# Patient Record
Sex: MALE | Race: WHITE | Employment: OTHER | URBAN - METROPOLITAN AREA
[De-identification: names, ages, dates, MRNs, and addresses within clinical notes are randomized per-mention and may not be internally consistent; named-entity substitution may affect disease eponyms.]

---

## 2017-10-09 ENCOUNTER — OFFICE VISIT (OUTPATIENT)
Dept: INTERNAL MEDICINE CLINIC | Age: 42
End: 2017-10-09

## 2017-10-09 VITALS
SYSTOLIC BLOOD PRESSURE: 139 MMHG | DIASTOLIC BLOOD PRESSURE: 88 MMHG | WEIGHT: 207 LBS | BODY MASS INDEX: 27.43 KG/M2 | HEIGHT: 73 IN | HEART RATE: 50 BPM

## 2017-10-09 DIAGNOSIS — R10.84 GENERALIZED ABDOMINAL PAIN: Primary | ICD-10-CM

## 2017-10-09 PROBLEM — R19.7 DIARRHEA: Status: ACTIVE | Noted: 2017-10-09

## 2017-10-09 PROBLEM — M76.50 TENDONITIS, PATELLAR: Status: ACTIVE | Noted: 2017-10-09

## 2017-10-09 NOTE — MR AVS SNAPSHOT
Visit Information Date & Time Provider Department Dept. Phone Encounter #  
 10/9/2017 10:30 AM PARISH Mello MD [address] [place] [address] [facility] [phone] [id number] Follow-up Instructions Return for as scheduled. Upcoming Health Maintenance Date Due DTaP/Tdap/Td series (1 - Tdap) 3/29/1998 INFLUENZA AGE 9 TO ADULT 8/1/2017 Allergies as of 10/9/2017  Review Complete On: 10/9/2017 By: Maite Lopez MD  
 Not on File Current Immunizations  Never Reviewed Name Date Influenza Vaccine 10/19/2015 Not reviewed this visit You Were Diagnosed With   
  
 Codes Comments Generalized abdominal pain    -  Primary ICD-10-CM: R10.84 ICD-9-CM: 789.07 Vitals BP Pulse Height(growth percentile) Weight(growth percentile) BMI Smoking Status 139/88 (BP 1 Location: Left arm, BP Patient Position: Sitting) (!) 50 6' 1\" (1.854 m) 207 lb (93.9 kg) 27.31 kg/m2 Never Smoker Vitals History BMI and BSA Data Body Mass Index Body Surface Area  
 27.31 kg/m 2 2.2 m 2 Your Updated Medication List  
  
Notice  As of 10/9/2017 11:47 AM  
 You have not been prescribed any medications. We Performed the Following CBC WITH AUTOMATED DIFF [42035 CPT(R)] CELIAC ANTIBODY PROFILE [ZEI01429 Custom] METABOLIC PANEL, COMPREHENSIVE [97916 CPT(R)] REFERRAL TO GASTROENTEROLOGY [YYJ34 Custom] Comments:  
 Dr. Bozena Young Please see patient for abdominal pain. Follow-up Instructions Return for as scheduled. Referral Information Referral ID Referred By Referred To  
  
 [id number] Ese Alvarez MD   
   [address] [place] [address] [address] [place] [address] Phone: [phone] Fax: [phone] Visits Status Start Date End Date 1 New Request 10/9/17 10/9/18  If your referral has a status of pending review or denied, additional information will be sent to support the outcome of this decision. Introducing [facility]! Joaquinjuanquynh Barnarda introduces QReca! patient portal. Now you can access parts of your medical record, email your doctor's office, and request medication refills online. 1. In your internet browser, go to [email or web address] 2. Click on the First Time User? Click Here link in the Sign In box. You will see the New Member Sign Up page. 3. Enter your QReca! Access Code exactly as it appears below. You will not need to use this code after youve completed the sign-up process. If you do not sign up before the expiration date, you must request a new code. · QReca! Access Code: [email or web address] Expires: 1/7/2018 10:56 AM 
 
4. Enter the last four digits of your Social Security Number (xxxx) and Date of Birth (mm/dd/yyyy) as indicated and click Submit. You will be taken to the next sign-up page. 5. Create a QReca! ID. This will be your QReca! login ID and cannot be changed, so think of one that is secure and easy to remember. 6. Create a QReca! password. You can change your password at any time. 7. Enter your Password Reset Question and Answer. This can be used at a later time if you forget your password. 8. Enter your e-mail address. You will receive e-mail notification when new information is available in [address] 9. Click Sign Up. You can now view and download portions of your medical record. 10. Click the Download Summary menu link to download a portable copy of your medical information. If you have questions, please visit the Frequently Asked Questions section of the QReca! website. Remember, QReca! is NOT to be used for urgent needs. For medical emergencies, dial 911. Now available from your iPhone and Android! Please provide this summary of care documentation to your next provider. Your primary care clinician is listed as PARISH Lawson.  If you have any questions after today's visit, please call [phone].

## 2017-10-09 NOTE — PROGRESS NOTES
This note will not be viewable in [address] Magno Harris is a 38 y.o. male and presents with Irritable Bowel Syndrome (RM 2 - talk about seeing GI MD)  . Subjective:  Dina Zapata presents today with complaint of severe abdominal pain that has occurred on 2 separate occasions. This was associated with severe crampy abdominal pain. This came on out of the blue. It was associated with diaphoresis. It lasted for approximately 1 hour. It culminated in explosive diarrhea. There was no blood or melena noted. Once the symptoms subsided there were no residual symptoms thereafter. This does not appear to be related to certain foods. Past Medical History:   Diagnosis Date    Diarrhea 10/9/2017    Tendonitis, patellar 10/9/2017     History reviewed. No pertinent surgical history. Not on File    Social History     Social History    Marital status:      Spouse name: N/A    Number of children: N/A    Years of education: N/A     Social History Main Topics    Smoking status: Never Smoker    Smokeless tobacco: Never Used    Alcohol use Yes      Comment: social    Drug use: No    Sexual activity: Not Asked     Other Topics Concern    None     Social History Narrative     History reviewed. No pertinent family history. Review of Systems  Constitutional:  negative for fevers, chills, anorexia and weight loss  Eyes:    negative for visual disturbance and irritation  ENT:    negative for tinnitus,sore throat,nasal congestion,ear pains. hoarseness  Respiratory:     negative for cough, hemoptysis, dyspnea,wheezing  CV:    negative for chest pain, palpitations, lower extremity edema  GI:    negative for nausea, vomiting, diarrhea, abdominal pain,melena  Endo:               negative for polyuria,polydipsia,polyphagia,heat intolerance  Genitourinary : negative for frequency, dysuria and hematuria  Integumentary: negative for rash and pruritus  Hematologic:   negative for easy bruising and gum/nose bleeding  Musculoskel:  negative for myalgias, arthralgias, back pain, muscle weakness, joint pain  Neurological:   negative for headaches, dizziness, vertigo, memory problems and gait   Behavl/Psych:  negative for feelings of anxiety, depression, mood changes  ROS otherwise negative      Objective:  Visit Vitals    /88 (BP 1 Location: Left arm, BP Patient Position: Sitting)    Pulse (!) 50    Ht 6' 1\" (1.854 m)    Wt 207 lb (93.9 kg)    BMI 27.31 kg/m2     Physical Exam:   General appearance - alert, well appearing, and in no distress  Mental status - alert, oriented to person, place, and time  Chest - clear to auscultation, no wheezes, rales or rhonchi, symmetric air entry   Heart - normal rate, regular rhythm, normal S1, S2, no murmurs, rubs, clicks or gallops   Abdomen - soft, nontender, nondistended, no masses or organomegaly  Lymph- no adenopathy palpable  Ext-peripheral pulses normal, no pedal edema, no clubbing or cyanosis  Skin-Warm and dry. no hyperpigmentation, vitiligo, or suspicious lesions  Neuro -alert, oriented, normal speech, no focal findings or movement disorder noted      Assessment/Plan:  Diagnoses and all orders for this visit:    1. Generalized abdominal pain  -     CELIAC ANTIBODY PROFILE  -     REFERRAL TO GASTROENTEROLOGY  -     CBC WITH AUTOMATED DIFF  -     METABOLIC PANEL, COMPREHENSIVE          ICD-10-CM ICD-9-CM    1. Generalized abdominal pain R10.84 789.07 CELIAC ANTIBODY PROFILE      REFERRAL TO GASTROENTEROLOGY      CBC WITH AUTOMATED DIFF      METABOLIC PANEL, COMPREHENSIVE      CANCELED: AMB POC COMPLETE CBC,AUTOMATED ENTER      CANCELED: AMB POC COMPREHENSIVE METABOLIC PANEL   Plan:    Patient symptoms are rather abrupt in onset and associated with severe diarrhea and abdominal pain. He will be referred to gastroenterology for further evaluation. Labs have been ordered for further review.   Differential includes inflammatory bowel disease, irritable bowel syndrome, partial obstruction, or other etiology. Follow-up Disposition:  Return for as scheduled. I have reviewed with the patient details of the assessment and plan and all questions were answered. Relevent patient education was performed. Verbal and/or written instructions (see AVS) provided. The most recent lab findings were reviewed with the patient. Plan was discussed with patient who verbally expressed understanding. An After Visit Summary was printed and given to the patient.     Eliazar Alfonso MD

## 2017-10-09 NOTE — PROGRESS NOTES
Shanon Li is a 38 y.o. male presenting for Irritable Bowel Syndrome (RM 2 - talk about seeing GI MD)  . 1. Have you been to the ER, urgent care clinic since your last visit? Hospitalized since your last visit? No    2. Have you seen or consulted any other health care providers outside of the [address] [place] [address] since your last visit? Include any pap smears or colon screening.  No

## 2017-10-11 LAB
ALBUMIN SERPL-MCNC: 4.5 G/DL (ref 3.5–5.5)
ALBUMIN/GLOB SERPL: 1.6 {RATIO} (ref 1.2–2.2)
ALP SERPL-CCNC: 99 IU/L (ref 39–117)
ALT SERPL-CCNC: 22 IU/L (ref 0–44)
AST SERPL-CCNC: 23 IU/L (ref 0–40)
BASOPHILS # BLD AUTO: 0 X10E3/UL (ref 0–0.2)
BASOPHILS NFR BLD AUTO: 1 %
BILIRUB SERPL-MCNC: 0.5 MG/DL (ref 0–1.2)
BUN SERPL-MCNC: 17 MG/DL (ref 6–24)
BUN/CREAT SERPL: 17 (ref 9–20)
CALCIUM SERPL-MCNC: 9.6 MG/DL (ref 8.7–10.2)
CHLORIDE SERPL-SCNC: 100 MMOL/L (ref 96–106)
CO2 SERPL-SCNC: 26 MMOL/L (ref 18–29)
CREAT SERPL-MCNC: 1.03 MG/DL (ref 0.76–1.27)
EOSINOPHIL # BLD AUTO: 0.1 X10E3/UL (ref 0–0.4)
EOSINOPHIL NFR BLD AUTO: 2 %
ERYTHROCYTE [DISTWIDTH] IN BLOOD BY AUTOMATED COUNT: 12.9 % (ref 12.3–15.4)
GLIADIN PEPTIDE IGA SER-ACNC: 7 UNITS (ref 0–19)
GLIADIN PEPTIDE IGG SER-ACNC: 2 UNITS (ref 0–19)
GLOBULIN SER CALC-MCNC: 2.9 G/DL (ref 1.5–4.5)
GLUCOSE SERPL-MCNC: 95 MG/DL (ref 65–99)
HCT VFR BLD AUTO: 43 % (ref 37.5–51)
HGB BLD-MCNC: 14.7 G/DL (ref 12.6–17.7)
IGA SERPL-MCNC: 377 MG/DL (ref 90–386)
IMM GRANULOCYTES # BLD: 0 X10E3/UL (ref 0–0.1)
IMM GRANULOCYTES NFR BLD: 0 %
LYMPHOCYTES # BLD AUTO: 2.1 X10E3/UL (ref 0.7–3.1)
LYMPHOCYTES NFR BLD AUTO: 37 %
MCH RBC QN AUTO: 29.1 PG (ref 26.6–33)
MCHC RBC AUTO-ENTMCNC: 34.2 G/DL (ref 31.5–35.7)
MCV RBC AUTO: 85 FL (ref 79–97)
MONOCYTES # BLD AUTO: 0.6 X10E3/UL (ref 0.1–0.9)
MONOCYTES NFR BLD AUTO: 11 %
NEUTROPHILS # BLD AUTO: 2.8 X10E3/UL (ref 1.4–7)
NEUTROPHILS NFR BLD AUTO: 49 %
PLATELET # BLD AUTO: 203 X10E3/UL (ref 150–379)
POTASSIUM SERPL-SCNC: 4.7 MMOL/L (ref 3.5–5.2)
PROT SERPL-MCNC: 7.4 G/DL (ref 6–8.5)
RBC # BLD AUTO: 5.05 X10E6/UL (ref 4.14–5.8)
SODIUM SERPL-SCNC: 140 MMOL/L (ref 134–144)
TTG IGA SER-ACNC: <2 U/ML (ref 0–3)
TTG IGG SER-ACNC: <2 U/ML (ref 0–5)
WBC # BLD AUTO: 5.6 X10E3/UL (ref 3.4–10.8)

## 2017-12-06 ENCOUNTER — HOSPITAL ENCOUNTER (OUTPATIENT)
Dept: CT IMAGING | Age: 42
Discharge: HOME | End: 2017-12-06
Attending: INTERNAL MEDICINE
Payer: COMMERCIAL

## 2017-12-06 DIAGNOSIS — R10.30 ABDOMINAL PAIN, LOWER: ICD-10-CM

## 2017-12-06 DIAGNOSIS — K92.1 HEMATOCHEZIA: ICD-10-CM

## 2017-12-06 DIAGNOSIS — R19.7 DIARRHEA: ICD-10-CM

## 2017-12-06 PROCEDURE — 74011250636 HC RX REV CODE- 250/636: Performed by: INTERNAL MEDICINE

## 2017-12-06 PROCEDURE — 74011636320 HC RX REV CODE- 636/320: Performed by: INTERNAL MEDICINE

## 2017-12-06 PROCEDURE — 74177 CT ABD & PELVIS W/CONTRAST: CPT

## 2017-12-06 PROCEDURE — 74011000255 HC RX REV CODE- 255: Performed by: INTERNAL MEDICINE

## 2017-12-06 RX ORDER — SODIUM CHLORIDE 9 MG/ML
50 INJECTION, SOLUTION INTRAVENOUS
Status: COMPLETED | OUTPATIENT
Start: 2017-12-06 | End: 2017-12-06

## 2017-12-06 RX ORDER — BARIUM SULFATE 20 MG/ML
900 SUSPENSION ORAL
Status: COMPLETED | OUTPATIENT
Start: 2017-12-06 | End: 2017-12-06

## 2017-12-06 RX ORDER — SODIUM CHLORIDE 0.9 % (FLUSH) 0.9 %
10 SYRINGE (ML) INJECTION
Status: COMPLETED | OUTPATIENT
Start: 2017-12-06 | End: 2017-12-06

## 2017-12-06 RX ADMIN — Medication 10 ML: at 10:36

## 2017-12-06 RX ADMIN — SODIUM CHLORIDE 50 ML/HR: 900 INJECTION, SOLUTION INTRAVENOUS at 10:36

## 2017-12-06 RX ADMIN — IOPAMIDOL 100 ML: 755 INJECTION, SOLUTION INTRAVENOUS at 10:36

## 2017-12-06 RX ADMIN — BARIUM SULFATE 900 ML: 21 SUSPENSION ORAL at 10:36

## 2018-04-04 ENCOUNTER — TELEPHONE (OUTPATIENT)
Dept: DERMATOLOGY | Facility: AMBULATORY SURGERY CENTER | Age: 43
End: 2018-04-04

## 2018-04-25 ENCOUNTER — OFFICE VISIT (OUTPATIENT)
Dept: DERMATOLOGY | Facility: AMBULATORY SURGERY CENTER | Age: 43
End: 2018-04-25

## 2018-04-25 VITALS
HEART RATE: 87 BPM | RESPIRATION RATE: 16 BRPM | SYSTOLIC BLOOD PRESSURE: 136 MMHG | DIASTOLIC BLOOD PRESSURE: 84 MMHG | TEMPERATURE: 98.2 F | OXYGEN SATURATION: 98 %

## 2018-04-25 DIAGNOSIS — C44.319 BASAL CELL CARCINOMA OF JAWLINE: Primary | ICD-10-CM

## 2018-04-25 NOTE — PATIENT INSTRUCTIONS
WOUND CARE INSTRUCTIONS    1. Keep the dressing clean and dry and do not remove for 48 hours. 2. Then change the dressing once a day as follows:  a. Wash hands before and after each dressing change. b. Remove dressing and wash site gently with mild soap and water, rinse, and pat dry.  c. Apply an ointment (Bacitracin, Polysporin, Neosporin, Petroleum jelly or Aquaphor). d. Apply a non-stick (Telfa) dressing or Band-Aid to cover the wound. Remove pressure bandage on friday, then wash gently and apply a thin layer Vaseline and a band-aid to site daily for 1 week. 3. Watch for:  BLEEDING: A small amount of drainage may occur. If bleeding occurs, elevate and rest the surgery site. Apply gauze and steady pressure for 15 minutes. If bleeding continues, call this office. INFECTION: Signs of infection include increased redness, pain, warmth, drainage of pus, and fever. If this occurs, call this office. 4. Special Instructions (follow any that are checked):  · [x] You have stitches that DO NOT need to be removed. · [x] Avoid bending at the waist and heavy lifting for two days. · [x] Sleep with your head elevated for the next two nights. · [x] Rest the surgery site and keep it elevated as much as possible for two days. · [] You may apply an ice-pack for 10-15 minutes every waking hour for the rest of the day. · [] Eat a soft diet and avoid hot food and hot drinks for the rest of the day. · [] Other instructions: Follow up as directed. Take Tylenol or Ibuprofen for pain as needed. Once the site is healed with no remaining bandages or open areas, protect your surgical site and scar from the sun, as this area will be more sensitive. Use a broad spectrum sunscreen SPF 30 or higher daily, and a chemical free product (one containing zinc oxide or titanium dioxide) is a good choice if the area is sensitive.     You may begin to gently massage the surgical site in 2-3 weeks, rubbing in a circular motion along the scar. This can help reduce swelling and thickness of a scar. A scar cream may be used beginnning 1 month after the surgery. If you have any questions or concerns, please call our office Monday through Friday at [phone].

## 2018-04-25 NOTE — MR AVS SNAPSHOT
[facility] [address] 
[phone] Patient: Hany Mcintyre MRN: [id number] :1975 Visit Information Date & Time Provider Department Dept. Phone Encounter #  
 2018 10:00 AM MD Matt Wayne 8057 [phone] [id number] Upcoming Health Maintenance Date Due DTaP/Tdap/Td series (1 - Tdap) 3/29/1998 Influenza Age 5 to Adult 2017 Allergies as of 2018  Review Complete On: 2018 By: Walter De Leon LPN No Known Allergies Current Immunizations  Never Reviewed Name Date Influenza Vaccine 10/19/2015 Not reviewed this visit You Were Diagnosed With   
  
 Codes Comments Basal cell carcinoma of jawline    -  Primary ICD-10-CM: C44.319 ICD-9-CM: 173.31 Vitals BP Pulse Temp Resp SpO2 Smoking Status 136/84 87 98.2 °F (36.8 °C) 16 98% Never Smoker Your Updated Medication List  
  
Notice  As of 2018 10:26 AM  
 You have not been prescribed any medications. Patient Instructions WOUND CARE INSTRUCTIONS 1. Keep the dressing clean and dry and do not remove for 48 hours. 2. Then change the dressing once a day as follows: 
a. Wash hands before and after each dressing change. b. Remove dressing and wash site gently with mild soap and water, rinse, and pat dry. 
c. Apply an ointment (Bacitracin, Polysporin, Neosporin, Petroleum jelly or Aquaphor). d. Apply a non-stick (Telfa) dressing or Band-Aid to cover the wound. Remove pressure bandage on friday, then wash gently and apply a thin layer Vaseline and a band-aid to site daily for 1 week. 3. Watch for: BLEEDING: A small amount of drainage may occur. If bleeding occurs, elevate and rest the surgery site. Apply gauze and steady pressure for 15 minutes. If bleeding continues, call this office. INFECTION: Signs of infection include increased redness, pain, warmth, drainage of pus, and fever. If this occurs, call this office. 4. Special Instructions (follow any that are checked): 
· [x] You have stitches that DO NOT need to be removed. · [x] Avoid bending at the waist and heavy lifting for two days. · [x] Sleep with your head elevated for the next two nights. · [x] Rest the surgery site and keep it elevated as much as possible for two days. · [] You may apply an ice-pack for 10-15 minutes every waking hour for the rest of the day. · [] Eat a soft diet and avoid hot food and hot drinks for the rest of the day. · [] Other instructions: Follow up as directed. Take Tylenol or Ibuprofen for pain as needed. Once the site is healed with no remaining bandages or open areas, protect your surgical site and scar from the sun, as this area will be more sensitive. Use a broad spectrum sunscreen SPF 30 or higher daily, and a chemical free product (one containing zinc oxide or titanium dioxide) is a good choice if the area is sensitive. You may begin to gently massage the surgical site in 2-3 weeks, rubbing in a circular motion along the scar. This can help reduce swelling and thickness of a scar. A scar cream may be used beginnning 1 month after the surgery. If you have any questions or concerns, please call our office Monday through Friday at [phone]. Introducing [facility]! Kamilah Hammonds introduces ChatterBlock patient portal. Now you can access parts of your medical record, email your doctor's office, and request medication refills online. 1. In your internet browser, go to [email or web address] 2. Click on the First Time User? Click Here link in the Sign In box. You will see the New Member Sign Up page. 3. Enter your ChatterBlock Access Code exactly as it appears below. You will not need to use this code after youve completed the sign-up process.  If you do not sign up before the expiration date, you must request a new code. · Invesdor Access Code: [email or web address] Expires: 6/12/2018  1:30 PM 
 
4. Enter the last four digits of your Social Security Number (xxxx) and Date of Birth (mm/dd/yyyy) as indicated and click Submit. You will be taken to the next sign-up page. 5. Create a Invesdor ID. This will be your Invesdor login ID and cannot be changed, so think of one that is secure and easy to remember. 6. Create a Invesdor password. You can change your password at any time. 7. Enter your Password Reset Question and Answer. This can be used at a later time if you forget your password. 8. Enter your e-mail address. You will receive e-mail notification when new information is available in [address] 9. Click Sign Up. You can now view and download portions of your medical record. 10. Click the Download Summary menu link to download a portable copy of your medical information. If you have questions, please visit the Frequently Asked Questions section of the Invesdor website. Remember, Invesdor is NOT to be used for urgent needs. For medical emergencies, dial 911. Now available from your iPhone and Android! Please provide this summary of care documentation to your next provider. Your primary care clinician is listed as PARISH Gnogora. If you have any questions after today's visit, please call [phone].

## 2018-04-25 NOTE — PROGRESS NOTES
This note is written by Leela Mancera, as dictated by Jacque Han. Levi Garrett MD.    CC: Basal cell carcinoma on the right jawline     History of present illness:     Jose Luis Smith is a 41 y.o. male referred by Dr. Kolton Sapp. He has a biopsy-proven nodular basal cell carcinoma on the right jawline. This is a new basal cell carcinoma present for one year described as a lesion noticed by Dr. Kolton Sapp with no prior treatment. Biopsy confirmed the diagnosis of basal cell carcinoma, and I reviewed the written pathology report. He is feeling well and in his usual state of health today. He has no pain, no current illnesses, no other skin concerns. His allergies, medications, medical, and social history are reviewed by me today. Exam:     He is an awake, alert, and oriented 41 y.o. male who appears well and in no distress. There is no preauricular, submandibular, or cervical lymphadenopathy. I examined his face. He has a 14 x 8 mm pearly papule on his right jawline. He confirms location. Assessment/plan:    1. Basal cell carcinoma, right jawline. I discussed the diagnosis of basal cell carcinoma and summarized the pathology report. Mohs surgery is indicated by site and size. The procedure was discussed, verbal and written consent were obtained. I performed the procedure. Two stages were required to reach a tumor free plane. The surgical defect was managed with intermediate repair. There were no complications. He will follow up as needed as the site heals. Indications, risks, and options were discussed with Jose Luis Smith preoperatively. Risks including, but not limited to: pain, bleeding, infection, tumor recurrence, scarring and damage to motor and/or sensory nerves, were discussed. Jose Luis Smith chose Mohs surgery. Jose Luis Smith was an acceptable surgery candidate. Jose Luis Smith was placed in the appropriate position on the operating table in the Mohs surgery procedure room.  The area was prepped and draped in the standard manner. Gentian violet was used to outline the clinical margins of the tumor. Local anesthesia was then obtained. The grossly visible tumor was then removed, an underlying layer was excised and mapped according to the Mohs technique, and the individual specimens examined microscopically. The process was repeated until microscopic examination of the tissue specimens confirmed a tumor-free plane. Hemostasis was obtained with electrosurgery and pressure. The wound was covered between stages with moist saline gauze. The wound management options of second intent healing, layered closure, local flap, and/or full thickness skin graft were discussed. Arthur Rosales understands the aims, risks, alternatives, and possible complications and elects to proceed with an intermediate layered closure. Wound margins were made vertical, edges undermined in the subcutaneous plane, standing cones corrected at both poles followed by layered closure. The wound was closed with buried 5-0 polysorb suture in the subcutis to reduce tension on the skin edges, and skin edges were approximated with 6-0 fast gut suture in the dermis to reduce tension on the epidermis. The final closure length was 38 mm. The wound was bandaged with Vaseline, Telfa, gauze and Coverroll. Wound care instructions (written and verbal) and a follow up appointment were given to Arthur Rosales before discharge. Arthur Rosales was discharged in good condition. 2. History of nonmelanoma skin cancer. I discussed the diagnosis and recommend routine examinations with Dr. Stefano Spann for surveillance. The documentation recorded by the scribe accurately reflects the service I personally performed and the decisions made by me. [facility]   OFFICE PROCEDURE PROGRESS NOTE     Chart reviewed for the following:     Rosemary Leal MD, have reviewed the History, Physical and updated the Allergic reactions for [employer]    TIME OUT performed immediately prior to start of procedure:     Cesilia Soto MD, have performed the following reviews on [employer] prior to the start of the procedure:     * Patient was identified by name and date of birth   * Agreement on procedure being performed was verified   * Risks and Benefits explained to the patient   * Procedure site verified and marked as necessary   * Patient was positioned for comfort   * Consent was signed and verified     Time: 10:10 AM   Date of procedure: 4/25/2018  Procedure performed by: Freddie Le.  Garth Soto MD   Provider assisted by: LPN   Patient assisted by: self   How tolerated by patient: tolerated the procedure well with no complications   Comments: none